# Patient Record
Sex: FEMALE | Race: WHITE | HISPANIC OR LATINO | URBAN - METROPOLITAN AREA
[De-identification: names, ages, dates, MRNs, and addresses within clinical notes are randomized per-mention and may not be internally consistent; named-entity substitution may affect disease eponyms.]

---

## 2018-08-18 ENCOUNTER — EMERGENCY (EMERGENCY)
Facility: HOSPITAL | Age: 53
LOS: 1 days | Discharge: HOME | End: 2018-08-18
Attending: EMERGENCY MEDICINE
Payer: COMMERCIAL

## 2018-08-18 VITALS
OXYGEN SATURATION: 95 % | HEART RATE: 93 BPM | HEIGHT: 61 IN | TEMPERATURE: 101 F | WEIGHT: 126.99 LBS | DIASTOLIC BLOOD PRESSURE: 90 MMHG | RESPIRATION RATE: 18 BRPM | SYSTOLIC BLOOD PRESSURE: 135 MMHG

## 2018-08-18 LAB
ALBUMIN SERPL ELPH-MCNC: 3.9 G/DL — SIGNIFICANT CHANGE UP (ref 3.3–5)
ALP SERPL-CCNC: 61 U/L — SIGNIFICANT CHANGE UP (ref 40–120)
ALT FLD-CCNC: 15 U/L — SIGNIFICANT CHANGE UP (ref 10–45)
ANION GAP SERPL CALC-SCNC: 15 MMOL/L — SIGNIFICANT CHANGE UP (ref 5–17)
APPEARANCE UR: CLEAR — SIGNIFICANT CHANGE UP
AST SERPL-CCNC: 19 U/L — SIGNIFICANT CHANGE UP (ref 10–40)
BACTERIA # UR AUTO: ABNORMAL /HPF
BASOPHILS # BLD AUTO: 0 K/UL — SIGNIFICANT CHANGE UP (ref 0–0.2)
BASOPHILS NFR BLD AUTO: 0 % — SIGNIFICANT CHANGE UP (ref 0–2)
BILIRUB SERPL-MCNC: 0.3 MG/DL — SIGNIFICANT CHANGE UP (ref 0.2–1.2)
BILIRUB UR-MCNC: NEGATIVE — SIGNIFICANT CHANGE UP
BUN SERPL-MCNC: 10 MG/DL — SIGNIFICANT CHANGE UP (ref 7–23)
CALCIUM SERPL-MCNC: 9.6 MG/DL — SIGNIFICANT CHANGE UP (ref 8.4–10.5)
CHLORIDE SERPL-SCNC: 96 MMOL/L — SIGNIFICANT CHANGE UP (ref 96–108)
CO2 SERPL-SCNC: 24 MMOL/L — SIGNIFICANT CHANGE UP (ref 22–31)
COLOR SPEC: YELLOW — SIGNIFICANT CHANGE UP
COMMENT - URINE: SIGNIFICANT CHANGE UP
CREAT SERPL-MCNC: 0.73 MG/DL — SIGNIFICANT CHANGE UP (ref 0.5–1.3)
DIFF PNL FLD: ABNORMAL
EOSINOPHIL # BLD AUTO: 0 K/UL — SIGNIFICANT CHANGE UP (ref 0–0.5)
EOSINOPHIL NFR BLD AUTO: 0.2 % — SIGNIFICANT CHANGE UP (ref 0–6)
EPI CELLS # UR: SIGNIFICANT CHANGE UP /HPF
GLUCOSE SERPL-MCNC: 110 MG/DL — HIGH (ref 70–99)
GLUCOSE UR QL: NEGATIVE — SIGNIFICANT CHANGE UP
HCT VFR BLD CALC: 40.4 % — SIGNIFICANT CHANGE UP (ref 34.5–45)
HGB BLD-MCNC: 13.4 G/DL — SIGNIFICANT CHANGE UP (ref 11.5–15.5)
KETONES UR-MCNC: ABNORMAL
LEUKOCYTE ESTERASE UR-ACNC: ABNORMAL
LYMPHOCYTES # BLD AUTO: 0.9 K/UL — LOW (ref 1–3.3)
LYMPHOCYTES # BLD AUTO: 7.9 % — LOW (ref 13–44)
MCHC RBC-ENTMCNC: 29.6 PG — SIGNIFICANT CHANGE UP (ref 27–34)
MCHC RBC-ENTMCNC: 33.1 GM/DL — SIGNIFICANT CHANGE UP (ref 32–36)
MCV RBC AUTO: 89.4 FL — SIGNIFICANT CHANGE UP (ref 80–100)
MONOCYTES # BLD AUTO: 1 K/UL — HIGH (ref 0–0.9)
MONOCYTES NFR BLD AUTO: 8.5 % — SIGNIFICANT CHANGE UP (ref 2–14)
NEUTROPHILS # BLD AUTO: 9.9 K/UL — HIGH (ref 1.8–7.4)
NEUTROPHILS NFR BLD AUTO: 83.3 % — HIGH (ref 43–77)
NITRITE UR-MCNC: NEGATIVE — SIGNIFICANT CHANGE UP
PH UR: 6 — SIGNIFICANT CHANGE UP (ref 5–8)
PLATELET # BLD AUTO: 265 K/UL — SIGNIFICANT CHANGE UP (ref 150–400)
POTASSIUM SERPL-MCNC: 3.6 MMOL/L — SIGNIFICANT CHANGE UP (ref 3.5–5.3)
POTASSIUM SERPL-SCNC: 3.6 MMOL/L — SIGNIFICANT CHANGE UP (ref 3.5–5.3)
PROT SERPL-MCNC: 7.3 G/DL — SIGNIFICANT CHANGE UP (ref 6–8.3)
PROT UR-MCNC: 30 MG/DL
RBC # BLD: 4.52 M/UL — SIGNIFICANT CHANGE UP (ref 3.8–5.2)
RBC # FLD: 12 % — SIGNIFICANT CHANGE UP (ref 10.3–14.5)
RBC CASTS # UR COMP ASSIST: ABNORMAL /HPF (ref 0–2)
SODIUM SERPL-SCNC: 135 MMOL/L — SIGNIFICANT CHANGE UP (ref 135–145)
SP GR SPEC: 1.02 — SIGNIFICANT CHANGE UP (ref 1.01–1.02)
UROBILINOGEN FLD QL: NEGATIVE — SIGNIFICANT CHANGE UP
WBC # BLD: 11.8 K/UL — HIGH (ref 3.8–10.5)
WBC # FLD AUTO: 11.8 K/UL — HIGH (ref 3.8–10.5)
WBC UR QL: SIGNIFICANT CHANGE UP /HPF (ref 0–5)

## 2018-08-18 PROCEDURE — 99284 EMERGENCY DEPT VISIT MOD MDM: CPT

## 2018-08-18 RX ORDER — KETOROLAC TROMETHAMINE 30 MG/ML
15 SYRINGE (ML) INJECTION ONCE
Refills: 0 | Status: DISCONTINUED | OUTPATIENT
Start: 2018-08-18 | End: 2018-08-18

## 2018-08-18 RX ORDER — SODIUM CHLORIDE 9 MG/ML
1000 INJECTION INTRAMUSCULAR; INTRAVENOUS; SUBCUTANEOUS ONCE
Refills: 0 | Status: COMPLETED | OUTPATIENT
Start: 2018-08-18 | End: 2018-08-18

## 2018-08-18 RX ORDER — ACETAMINOPHEN 500 MG
975 TABLET ORAL ONCE
Refills: 0 | Status: COMPLETED | OUTPATIENT
Start: 2018-08-18 | End: 2018-08-18

## 2018-08-18 RX ORDER — SODIUM CHLORIDE 9 MG/ML
1000 INJECTION, SOLUTION INTRAVENOUS ONCE
Refills: 0 | Status: COMPLETED | OUTPATIENT
Start: 2018-08-18 | End: 2018-08-18

## 2018-08-18 RX ADMIN — Medication 975 MILLIGRAM(S): at 21:09

## 2018-08-18 RX ADMIN — SODIUM CHLORIDE 1000 MILLILITER(S): 9 INJECTION INTRAMUSCULAR; INTRAVENOUS; SUBCUTANEOUS at 21:09

## 2018-08-18 RX ADMIN — Medication 15 MILLIGRAM(S): at 21:09

## 2018-08-18 RX ADMIN — SODIUM CHLORIDE 1000 MILLILITER(S): 9 INJECTION, SOLUTION INTRAVENOUS at 23:29

## 2018-08-18 NOTE — ED ADULT NURSE NOTE - NSIMPLEMENTINTERV_GEN_ALL_ED
Implemented All Universal Safety Interventions:  [place] to call system. Call bell, personal items and telephone within reach. Instruct patient to call for assistance. Room bathroom lighting operational. Non-slip footwear when patient is off stretcher. Physically safe environment: no spills, clutter or unnecessary equipment. Stretcher in lowest position, wheels locked, appropriate side rails in place.

## 2018-08-18 NOTE — ED PROVIDER NOTE - CARE PLAN
Principal Discharge DX:	Pyelonephritis  Assessment and plan of treatment:	Follow up with your medical doctor in 2-3 days or call our clinic at [phone] and state you were seen in the Emergency Department and would like to be seen in clinic.   Take Tylenol 1g every six hours and supplement with ibuprofen 600mg, with food, every six hours which can be taken three hours apart from the Tylenol to have a layered effect.  Please continue taking ciprofloxacin twice a day for 7 days.  Drink at least 2 Liters or 64 Ounces of water each day.  Return for any persistent, worsening symptoms, or ANY concerns at all.

## 2018-08-18 NOTE — ED PROVIDER NOTE - OBJECTIVE STATEMENT
52 yo female presenting with fevers malaise and right flank achy abdominal pain, 7/10 in severity with no radiation.  worse with pressure, improved with tylenol.  diagnosed with uti and given cipro today at [facility] md.  endorses foul smelling urine.  vomited x 1 on friday.  symptoms started on thursday.  went to urgent care earlier today.

## 2018-08-18 NOTE — ED PROVIDER NOTE - NS ED ROS FT
Constitutional: + fever  Eyes: no conjunctivitis  Ears: no ear pain   Nose: no nasal congestion, Mouth/Throat: no throat pain, Neck: no stiffness  Cardiovascular: no chest pain  Chest: no cough  Gastrointestinal: + abdominal pain, +vomiting and no diarrhea  MSK: no joint pain  : no dysuria +foul smelling urine  Skin: no rash  Neuro: no LOC

## 2018-08-18 NOTE — ED ADULT NURSE NOTE - OBJECTIVE STATEMENT
Pt presents to the ED with complaint of R lower quadrant pain. Pt AXOX3, reports fever and body aches with RLQ pain. Pt went to urgent care and was diagnosed with a UTI, sent home with PO Cipro. Pt states she took 2 tablets already, states she was told to come to ED if pain persisted, which it has. Pt reports "stomach feels inflamed", home tmax 101. Abdomen soft and tender to palpation to Right lower quadrant, CVA tenderness noted to R flank, no hematuria or dysuria, pt reports malodorous urine, fevers and chills at home, No shortness of breath, skins color normal for age and race, no chest pain, pt reports intermittent nausea and one episode of vomiting friday evening, no diarrhea reported.

## 2018-08-18 NOTE — ED PROVIDER NOTE - PHYSICAL EXAMINATION
gen: well appearing  Mentation: AAO x 3  psych: mood appropriate  ENT: airway patent  Eyes: conjunctivae clear bilaterally  Cardio: RRR, no m/r/g  Resp: normal BS b/l  GI: s/ruq tenderness with positive right cva tenderness/nd   Neuro: AAO x 3, sensation and motor function intact, CN 2-12 intact  Skin: No evidence of rash  MSK: normal movement of all extremities

## 2018-08-18 NOTE — ED PROVIDER NOTE - MEDICAL DECISION MAKING DETAILS
54 yo female with fevers chills foul smelling urine; urine labs ultrasound to eval for jean carlos and hydro pain control fluids --> re eval

## 2018-08-18 NOTE — ED PROVIDER NOTE - ATTENDING CONTRIBUTION TO CARE
52 yo female, recently diagnosed with UTI, p/w RUQ/R flank pain.  Concern for jean carlos vs. pyelo.  Will check labs, UA, abdominal u/s and reassess.

## 2018-08-18 NOTE — ED PROVIDER NOTE - PROGRESS NOTE DETAILS
patient feels significantly better, tolerating po, will continue with her ciprofloxacin as she already took two doses today -m[place]uer[place]

## 2018-08-18 NOTE — ED ADULT TRIAGE NOTE - CHIEF COMPLAINT QUOTE
fever, body aches, frequency of urination, went to urgent care center was diagnosed with UTI, started on Cipro. "still don't feel well"

## 2018-08-18 NOTE — ED PROVIDER NOTE - PLAN OF CARE
Follow up with your medical doctor in 2-3 days or call our clinic at [phone] and state you were seen in the Emergency Department and would like to be seen in clinic.   Take Tylenol 1g every six hours and supplement with ibuprofen 600mg, with food, every six hours which can be taken three hours apart from the Tylenol to have a layered effect.  Please continue taking ciprofloxacin twice a day for 7 days.  Drink at least 2 Liters or 64 Ounces of water each day.  Return for any persistent, worsening symptoms, or ANY concerns at all.

## 2018-08-19 VITALS
OXYGEN SATURATION: 98 % | DIASTOLIC BLOOD PRESSURE: 79 MMHG | TEMPERATURE: 98 F | SYSTOLIC BLOOD PRESSURE: 123 MMHG | RESPIRATION RATE: 18 BRPM | HEART RATE: 86 BPM

## 2018-08-19 LAB
CULTURE RESULTS: NO GROWTH — SIGNIFICANT CHANGE UP
SPECIMEN SOURCE: SIGNIFICANT CHANGE UP

## 2018-08-19 PROCEDURE — 76700 US EXAM ABDOM COMPLETE: CPT | Mod: 26

## 2018-08-19 PROCEDURE — 74177 CT ABD & PELVIS W/CONTRAST: CPT | Mod: 26

## 2019-07-10 PROBLEM — Z00.00 ENCOUNTER FOR PREVENTIVE HEALTH EXAMINATION: Status: ACTIVE | Noted: 2019-07-10

## 2019-07-11 ENCOUNTER — APPOINTMENT (OUTPATIENT)
Dept: OPHTHALMOLOGY | Facility: CLINIC | Age: 54
End: 2019-07-11

## 2020-03-25 ENCOUNTER — APPOINTMENT (OUTPATIENT)
Dept: OPHTHALMOLOGY | Facility: CLINIC | Age: 55
End: 2020-03-25

## 2020-10-21 ENCOUNTER — NON-APPOINTMENT (OUTPATIENT)
Age: 55
End: 2020-10-21

## 2020-10-21 ENCOUNTER — APPOINTMENT (OUTPATIENT)
Dept: OPHTHALMOLOGY | Facility: CLINIC | Age: 55
End: 2020-10-21
Payer: COMMERCIAL

## 2020-10-21 PROCEDURE — 92004 COMPRE OPH EXAM NEW PT 1/>: CPT

## 2020-10-21 PROCEDURE — 99072 ADDL SUPL MATRL&STAF TM PHE: CPT

## 2020-11-12 ENCOUNTER — APPOINTMENT (OUTPATIENT)
Dept: DERMATOLOGY | Facility: CLINIC | Age: 55
End: 2020-11-12
Payer: COMMERCIAL

## 2020-11-12 VITALS — BODY MASS INDEX: 24.17 KG/M2 | WEIGHT: 128 LBS | HEIGHT: 61 IN

## 2020-11-12 DIAGNOSIS — L81.1 CHLOASMA: ICD-10-CM

## 2020-11-12 DIAGNOSIS — L65.8 OTHER SPECIFIED NONSCARRING HAIR LOSS: ICD-10-CM

## 2020-11-12 DIAGNOSIS — L24.9 IRRITANT CONTACT DERMATITIS, UNSPECIFIED CAUSE: ICD-10-CM

## 2020-11-12 PROCEDURE — 99203 OFFICE O/P NEW LOW 30 MIN: CPT

## 2020-11-12 PROCEDURE — 99072 ADDL SUPL MATRL&STAF TM PHE: CPT

## 2020-11-12 RX ORDER — TRIAMCINOLONE ACETONIDE 1 MG/G
0.1 CREAM TOPICAL TWICE DAILY
Qty: 1 | Refills: 1 | Status: ACTIVE | COMMUNITY
Start: 2020-11-12 | End: 1900-01-01

## 2020-11-16 ENCOUNTER — NON-APPOINTMENT (OUTPATIENT)
Age: 55
End: 2020-11-16

## 2020-11-20 ENCOUNTER — APPOINTMENT (OUTPATIENT)
Dept: ORTHOPEDIC SURGERY | Facility: CLINIC | Age: 55
End: 2020-11-20
Payer: COMMERCIAL

## 2020-11-20 VITALS — HEIGHT: 61 IN | BODY MASS INDEX: 24.55 KG/M2 | WEIGHT: 130 LBS

## 2020-11-20 DIAGNOSIS — M70.62 TROCHANTERIC BURSITIS, LEFT HIP: ICD-10-CM

## 2020-11-20 DIAGNOSIS — M51.36 OTHER INTERVERTEBRAL DISC DEGENERATION, LUMBAR REGION: ICD-10-CM

## 2020-11-20 PROCEDURE — 73502 X-RAY EXAM HIP UNI 2-3 VIEWS: CPT | Mod: LT

## 2020-11-20 PROCEDURE — 99203 OFFICE O/P NEW LOW 30 MIN: CPT

## 2020-11-20 PROCEDURE — 72100 X-RAY EXAM L-S SPINE 2/3 VWS: CPT

## 2020-12-06 ENCOUNTER — OUTPATIENT (OUTPATIENT)
Dept: OUTPATIENT SERVICES | Facility: HOSPITAL | Age: 55
LOS: 1 days | End: 2020-12-06
Payer: COMMERCIAL

## 2020-12-06 ENCOUNTER — APPOINTMENT (OUTPATIENT)
Dept: MRI IMAGING | Facility: CLINIC | Age: 55
End: 2020-12-06
Payer: COMMERCIAL

## 2020-12-06 ENCOUNTER — RESULT REVIEW (OUTPATIENT)
Age: 55
End: 2020-12-06

## 2020-12-06 DIAGNOSIS — M51.36 OTHER INTERVERTEBRAL DISC DEGENERATION, LUMBAR REGION: ICD-10-CM

## 2020-12-06 PROCEDURE — 72148 MRI LUMBAR SPINE W/O DYE: CPT | Mod: 26

## 2020-12-07 ENCOUNTER — APPOINTMENT (OUTPATIENT)
Dept: OPHTHALMOLOGY | Facility: CLINIC | Age: 55
End: 2020-12-07

## 2020-12-07 ENCOUNTER — NON-APPOINTMENT (OUTPATIENT)
Age: 55
End: 2020-12-07

## 2020-12-14 ENCOUNTER — APPOINTMENT (OUTPATIENT)
Dept: ORTHOPEDIC SURGERY | Facility: CLINIC | Age: 55
End: 2020-12-14
Payer: COMMERCIAL

## 2020-12-14 ENCOUNTER — TRANSCRIPTION ENCOUNTER (OUTPATIENT)
Age: 55
End: 2020-12-14

## 2020-12-14 VITALS
HEIGHT: 61 IN | SYSTOLIC BLOOD PRESSURE: 134 MMHG | WEIGHT: 129 LBS | DIASTOLIC BLOOD PRESSURE: 75 MMHG | HEART RATE: 75 BPM | BODY MASS INDEX: 24.35 KG/M2

## 2020-12-14 VITALS — TEMPERATURE: 96.5 F

## 2020-12-14 PROCEDURE — 99214 OFFICE O/P EST MOD 30 MIN: CPT

## 2020-12-14 PROCEDURE — 99072 ADDL SUPL MATRL&STAF TM PHE: CPT

## 2021-03-17 ENCOUNTER — APPOINTMENT (OUTPATIENT)
Dept: ORTHOPEDIC SURGERY | Facility: CLINIC | Age: 56
End: 2021-03-17

## 2022-01-20 ENCOUNTER — NON-APPOINTMENT (OUTPATIENT)
Age: 57
End: 2022-01-20

## 2022-01-20 ENCOUNTER — APPOINTMENT (OUTPATIENT)
Dept: OPHTHALMOLOGY | Facility: CLINIC | Age: 57
End: 2022-01-20
Payer: COMMERCIAL

## 2022-01-20 PROCEDURE — 92014 COMPRE OPH EXAM EST PT 1/>: CPT

## 2022-01-20 PROCEDURE — 92134 CPTRZ OPH DX IMG PST SGM RTA: CPT

## 2022-02-21 ENCOUNTER — RESULT REVIEW (OUTPATIENT)
Age: 57
End: 2022-02-21

## 2023-05-16 ENCOUNTER — APPOINTMENT (OUTPATIENT)
Dept: ORTHOPEDIC SURGERY | Facility: CLINIC | Age: 58
End: 2023-05-16
Payer: COMMERCIAL

## 2023-05-16 ENCOUNTER — NON-APPOINTMENT (OUTPATIENT)
Age: 58
End: 2023-05-16

## 2023-05-16 VITALS
DIASTOLIC BLOOD PRESSURE: 83 MMHG | HEART RATE: 58 BPM | HEIGHT: 61 IN | BODY MASS INDEX: 24.55 KG/M2 | WEIGHT: 130 LBS | SYSTOLIC BLOOD PRESSURE: 148 MMHG

## 2023-05-16 DIAGNOSIS — M79.673 PAIN IN UNSPECIFIED FOOT: ICD-10-CM

## 2023-05-16 DIAGNOSIS — M10.9 GOUT, UNSPECIFIED: ICD-10-CM

## 2023-05-16 PROCEDURE — 99203 OFFICE O/P NEW LOW 30 MIN: CPT

## 2023-05-16 PROCEDURE — 73630 X-RAY EXAM OF FOOT: CPT | Mod: LT

## 2023-05-16 RX ORDER — NAPROXEN 500 MG/1
500 TABLET ORAL
Qty: 20 | Refills: 1 | Status: ACTIVE | COMMUNITY
Start: 2023-05-16 | End: 1900-01-01

## 2023-05-17 LAB — URATE SERPL-MCNC: 4.5 MG/DL

## 2024-01-29 ENCOUNTER — APPOINTMENT (OUTPATIENT)
Dept: ORTHOPEDIC SURGERY | Facility: CLINIC | Age: 59
End: 2024-01-29
Payer: COMMERCIAL

## 2024-01-29 VITALS
DIASTOLIC BLOOD PRESSURE: 80 MMHG | HEIGHT: 61 IN | SYSTOLIC BLOOD PRESSURE: 135 MMHG | BODY MASS INDEX: 25.11 KG/M2 | HEART RATE: 74 BPM | WEIGHT: 133 LBS

## 2024-01-29 DIAGNOSIS — M54.2 CERVICALGIA: ICD-10-CM

## 2024-01-29 DIAGNOSIS — M51.36 OTHER INTERVERTEBRAL DISC DEGENERATION, LUMBAR REGION: ICD-10-CM

## 2024-01-29 PROCEDURE — 72100 X-RAY EXAM L-S SPINE 2/3 VWS: CPT

## 2024-01-29 PROCEDURE — 72040 X-RAY EXAM NECK SPINE 2-3 VW: CPT

## 2024-01-29 PROCEDURE — 99204 OFFICE O/P NEW MOD 45 MIN: CPT

## 2024-01-29 RX ORDER — DICLOFENAC SODIUM 75 MG/1
75 TABLET, DELAYED RELEASE ORAL
Qty: 60 | Refills: 1 | Status: ACTIVE | COMMUNITY
Start: 2024-01-29 | End: 1900-01-01

## 2024-01-29 NOTE — DISCUSSION/SUMMARY
[de-identified] : Patient with Lumbar degenerative disc disease and cervical degenerative disc disease. Discussed all options. Patient is to begin PT. A script was provided today.  Discussed exercise do's and don'ts. Diclofenac prn. MRI of the lumbar spine was ordered. Follow up when results are available and discuss further treatment plan. All options discussed including rest, medicine, home exercise, acupuncture, Chiropractic care, Physical Therapy, Pain management, and last resort surgery. All questions were answered, all alternatives discussed, and the patient is in complete agreement with the treatment plan which the patient contributed to and discussed with me through the shared decision-making process. Follow-up appointment as instructed. Any issues and the patient will call or come in sooner.

## 2024-01-29 NOTE — HISTORY OF PRESENT ILLNESS
[Stable] : stable [de-identified] : Ms. DALE POLANCO is a 58 year female who presents to office complaining of neck and lower back pain. Regarding her neck, patient has seen an outside neurologist for headaches who ordered an MRI of the cervical spine about 1 year ago, which showed herniated discs. He suggested she do PT for this, which she did, which did not help. Aside from this, she has not had any other treatment such as ESIs. Her current symptoms of the neck are a constant dull, achy pain localized to the midline of the neck. Pain is aggravated with working out or with overhead lifting. This occasionally results in numbness/tingling of her LUE. Patient was previously seen for lower back pain in Dec 2020. SHx of lumbar laminectomy in January 1992. Pain radiates down LLE to the L hip and down to posterior thigh. Has occasional tingling of LLE. Running aggravates the pain. Has been taking Motrin and has relief. Also has been doing yoga and stretching which alleviates pain. She has not been exercising due to the pain Has not done PT or chiropractic care recently or ESIs. Has MRI Lumbar 12/6/20 (PACS). No fever chills sweats nausea vomiting no bowel or bladder dysfunction, no recent weight loss or gain no night pain. This history is in addition to the intake form that I personally reviewed.

## 2024-01-29 NOTE — PHYSICAL EXAM
[Normal] : Gait: normal [Moreno's Sign] : negative Moreno's sign [Pronator Drift] : negative pronator drift [SLR] : negative straight leg raise [de-identified] : 5 out of 5 motor strength, sensation is intact and symmetrical full range of motion flexion extension and rotation,  no palpatory tenderness full range of motion of hips knees shoulders and elbows (all four extremities),  no atrophy, negative straight leg raise, no pathological reflexes, no swelling, normal ambulation,  no apparent distress skin is intact, no palpable lymph nodes, no upper or lower extremity instability,  alert and oriented x3 and normal mood. Normal finger-to nose test. Limited right rotation  [de-identified] : XR AP Lat Cervical 01/29/2024 - cervical degenerative disc disease at the C5-C6, C6 - C7 level - reviewed with patient.  XR AP Lat Lumbar 01/29/2024 - Lumbar degenerative disc disease at the L3 - L4, L4 - 5, and L5 - S1 level, spondylolisthesis at L3 - L4, L4 - L5 level - reviewed with patient.

## 2024-01-29 NOTE — ADDENDUM
[FreeTextEntry1] : I, Tyar Saleh, acted solely as a scribe for Dr. Burak Xaio MD on this date 01/29/2024.   All medical record entries made by the Scribe were at my, Dr. Burak Xiao MD, direction and personally dictated by me on 01/29/2024. I have reviewed the chart and agree that the record accurately reflects my personal performance of the history, physical exam, assessment and plan. I have also personally directed, reviewed, and agreed with the chart.

## 2024-02-12 ENCOUNTER — NON-APPOINTMENT (OUTPATIENT)
Age: 59
End: 2024-02-12

## 2024-02-18 ENCOUNTER — APPOINTMENT (OUTPATIENT)
Dept: MRI IMAGING | Facility: CLINIC | Age: 59
End: 2024-02-18
Payer: COMMERCIAL

## 2024-02-18 ENCOUNTER — OUTPATIENT (OUTPATIENT)
Dept: OUTPATIENT SERVICES | Facility: HOSPITAL | Age: 59
LOS: 1 days | End: 2024-02-18
Payer: COMMERCIAL

## 2024-02-18 DIAGNOSIS — M48.07 SPINAL STENOSIS, LUMBOSACRAL REGION: ICD-10-CM

## 2024-02-18 PROCEDURE — 72148 MRI LUMBAR SPINE W/O DYE: CPT

## 2024-02-18 PROCEDURE — 72148 MRI LUMBAR SPINE W/O DYE: CPT | Mod: 26

## 2024-03-04 ENCOUNTER — APPOINTMENT (OUTPATIENT)
Dept: ORTHOPEDIC SURGERY | Facility: CLINIC | Age: 59
End: 2024-03-04
Payer: COMMERCIAL

## 2024-03-04 VITALS — HEIGHT: 61 IN | BODY MASS INDEX: 25.3 KG/M2 | WEIGHT: 134 LBS

## 2024-03-04 PROCEDURE — 99214 OFFICE O/P EST MOD 30 MIN: CPT

## 2024-03-04 NOTE — DISCUSSION/SUMMARY
[de-identified] : Lumbar degenerative disc disease and cervical degenerative disc disease. L4-5 spondylolisthesis and stenosis. +right rotator cuff pathology. Discussed all options. Right shoulder MRI referral. F/U after MRI. Surgery would be L4-S1 laminectomy and fusion. We will avoid surgery for now. All options discussed including rest, medicine, home exercise, acupuncture, Chiropractic care, Physical Therapy, Pain management, and last resort surgery. All questions were answered, all alternatives discussed, and the patient is in complete agreement with the treatment plan which the patient contributed to and discussed with me through the shared decision-making process. Follow-up appointment as instructed. Any issues and the patient will call or come in sooner.

## 2024-03-04 NOTE — HISTORY OF PRESENT ILLNESS
[Stable] : stable [de-identified] : Ms. DALE POLANCO is a 58 year female who presents to office complaining of neck and lower back pain. SHx of lumbar laminectomy in January 1992. Regarding her neck, patient has seen an outside neurologist for headaches who ordered an MRI of the cervical spine about 1 year ago, which showed herniated discs. He suggested she do PT for this, which she did, which did not help. Aside from this, she has not had any other treatment such as ESIs. Her current symptoms of the neck are a constant dull, achy pain localized to the midline of the neck. Pain is aggravated with working out or with overhead lifting. This occasionally results in numbness/tingling of her LUE. Pain radiates down LLE to the L hip and down to posterior thigh. Has occasional tingling of LLE. Running aggravates the pain. Has been taking Motrin and has relief. Also has been doing yoga and stretching which alleviates pain. She has not been exercising due to the pain Denies EDUARDO.  Was referred to PT at last visit. Has not started PT yet.  She also complains of B/L shoulder pain, R>L. Sees an outside orthopedist for this.  Presents today for MRI Lumbar review. No fever chills sweats nausea vomiting no bowel or bladder dysfunction, no recent weight loss or gain no night pain. This history is in addition to the intake form that I personally reviewed.

## 2024-03-04 NOTE — PHYSICAL EXAM
[Normal] : Gait: normal [Moreno's Sign] : negative Moreno's sign [Pronator Drift] : negative pronator drift [SLR] : negative straight leg raise [de-identified] : 5 out of 5 motor strength, sensation is intact and symmetrical full range of motion flexion extension and rotation, no palpatory tenderness full range of motion of hips knees shoulders and elbows (all four extremities), no atrophy, negative straight leg raise, no pathological reflexes, no swelling, normal ambulation, no apparent distress skin is intact, no palpable lymph nodes, no upper or lower extremity instability, alert and oriented x3 and normal mood. Normal finger-to nose test.  No upper motor neuron findings. +right rotator cuff pathology. [de-identified] : I reviewed, interpreted and clinically correlated the following outside imaging studies,  MR SPINE LUMBAR  - ORDERED BY: RUT GAMEZ   PROCEDURE DATE:  02/18/2024    INTERPRETATION:  CLINICAL INFORMATION: Lumbosacral stenosis  ADDITIONAL CLINICAL INFORMATION: Spondylolisthesis M43.16  TECHNIQUE: Multiplanar, multisequence MRI was performed of the lumbar spine. IV Contrast: NONE  PRIOR STUDIES: 12/6/2020  FINDINGS:  LOCALIZER: No additional findings. BONES: There is no fracture or bone marrow edema. ALIGNMENT: Grade 1 anterolisthesis at L4-L5. SACROILIAC JOINTS/SACRUM: There is no sacral fracture. The SI joints are partially visualized but are intact. CONUS AND CAUDA EQUINA: The distal cord and conus are normal in signal. Conus terminates at L1. VISUALIZED INTRAPELVIC/INTRA-ABDOMINAL SOFT TISSUES: Normal. PARASPINAL SOFT TISSUES: Normal.   INDIVIDUAL LEVELS:  LOWER THORACIC SPINE: No spinal canal or neuroforaminal stenosis.  L1-L2: Mild facet arthropathy. No spinal canal or neuroforaminal stenosis. L2-L3: Small posterior disc bulge and mild bilateral facet arthropathy without significant central canal or neural foraminal narrowing. L3-L4: Small broad-based posterior disc bulge and moderate bilateral facet arthropathy result in mild bilateral neural foraminal narrowing and mild to moderate central canal narrowing. There is bilateral lateral recess stenosis. L4-L5: Grade 1 anterolisthesis. Broad-based posterior disc bulge and moderate bilateral facet arthropathy result in mild to moderate bilateral neural foraminal narrowing and mild central canal narrowing. There is right lateral recess stenosis. L5-S1: Small posterior disc osteophyte complex and moderate bilateral facet arthropathy result in mild bilateral neural foraminal narrowing but no significant central canal narrowing.   IMPRESSION:  Multilevel discogenic degenerative disease and facet arthropathy of the lumbar spine, most pronounced at L4-L5 where there is mild to moderate bilateral neural foraminal narrowing and mild central canal narrowing. Additional varying degrees of central canal and neural foraminal narrowing, as above. These findings are not significantly changed as compared to 12/6/2020.  --- End of Report ---       XR AP Lat Cervical 01/29/2024 - cervical degenerative disc disease at the C5-C6, C6 - C7 level - reviewed with patient.  XR AP Lat Lumbar 01/29/2024 - Lumbar degenerative disc disease at the L3 - L4, L4 - 5, and L5 - S1 level, spondylolisthesis at L3 - L4, L4 - L5 level - reviewed with patient.

## 2024-03-04 NOTE — ADDENDUM
[FreeTextEntry1] : This note was written by Amos James on 03/04/2024 acting as scribe for Dr. Case Xiao M.D.  I, Case Xiao MD, have read and attest that all the information, medical decision making and discharge instructions within are true and accurate.

## 2024-03-08 ENCOUNTER — APPOINTMENT (OUTPATIENT)
Dept: MRI IMAGING | Facility: CLINIC | Age: 59
End: 2024-03-08
Payer: COMMERCIAL

## 2024-03-08 ENCOUNTER — OUTPATIENT (OUTPATIENT)
Dept: OUTPATIENT SERVICES | Facility: HOSPITAL | Age: 59
LOS: 1 days | End: 2024-03-08
Payer: COMMERCIAL

## 2024-03-08 DIAGNOSIS — Z00.8 ENCOUNTER FOR OTHER GENERAL EXAMINATION: ICD-10-CM

## 2024-03-08 DIAGNOSIS — M51.36 OTHER INTERVERTEBRAL DISC DEGENERATION, LUMBAR REGION: ICD-10-CM

## 2024-03-08 PROCEDURE — 73221 MRI JOINT UPR EXTREM W/O DYE: CPT | Mod: 26,RT

## 2024-03-08 PROCEDURE — 73221 MRI JOINT UPR EXTREM W/O DYE: CPT

## 2024-03-14 ENCOUNTER — APPOINTMENT (OUTPATIENT)
Dept: ORTHOPEDIC SURGERY | Facility: CLINIC | Age: 59
End: 2024-03-14
Payer: COMMERCIAL

## 2024-03-14 VITALS — HEIGHT: 61 IN | BODY MASS INDEX: 25.3 KG/M2 | WEIGHT: 134 LBS

## 2024-03-14 DIAGNOSIS — M48.07 SPINAL STENOSIS, LUMBOSACRAL REGION: ICD-10-CM

## 2024-03-14 DIAGNOSIS — M43.16 SPONDYLOLISTHESIS, LUMBAR REGION: ICD-10-CM

## 2024-03-14 PROCEDURE — 99214 OFFICE O/P EST MOD 30 MIN: CPT

## 2024-03-14 NOTE — ADDENDUM
[FreeTextEntry1] : This note was written by Amos James on 03/14/2024 acting as scribe for Dr. Case Xiao M.D.  I, Case Xiao MD, have read and attest that all the information, medical decision making and discharge instructions within are true and accurate.

## 2024-03-14 NOTE — PHYSICAL EXAM
[Normal] : Gait: normal [Pronator Drift] : negative pronator drift [Moreno's Sign] : negative Moreno's sign [SLR] : negative straight leg raise [de-identified] : 5 out of 5 motor strength, sensation is intact and symmetrical full range of motion flexion extension and rotation, no palpatory tenderness full range of motion of hips knees shoulders and elbows (all four extremities), no atrophy, negative straight leg raise, no pathological reflexes, no swelling, normal ambulation, no apparent distress skin is intact, no palpable lymph nodes, no upper or lower extremity instability, alert and oriented x3 and normal mood. Normal finger-to nose test.  No upper motor neuron findings. +right rotator cuff pathology. [de-identified] : I reviewed, interpreted and clinically correlated the following outside imaging studies,  MR SHOULDER RT  - ORDERED BY: RUT GAMEZ   PROCEDURE DATE:  03/08/2024    INTERPRETATION:  CLINICAL INDICATION: Shoulder pain for one month.  Multiplanar Multisequence MR of the right shoulder.  Prior Studies: None.  FINDINGS:  ROTATOR CUFF: Moderate tendinosis of the supraspinatus tendon with foci of intrasubstance tearing along the mid to posterior insertional fibers. Mild to moderate tendinosis of the infraspinatus tendon with enthesopathic change along the posterior aspect of the greater tuberosity. The subscapularis and teres minor tendinous insertions are intact.  MUSCLES: There is no fatty atrophy or edema within the rotator cuff musculature or deltoid muscle.  LONG HEAD BICEPS TENDON: Mild to moderate tendinosis of the intra-articular long head biceps tendon. There is mild fraying at the biceps anchor. Circumferential fluid is seen within the extra articular biceps tendon sheath suggestive of tenosynovitis.  GLENOHUMERAL JOINT: Degeneration of the glenoid labrum. Partial-thickness nondisplaced tearing along the chondral labral junction of the superior to posterior superior glenoid labrum. The articular surfaces are preserved.  SYNOVIUM: Subacromial/subdeltoid bursitis. Mild glenohumeral joint effusion.  ACROMIOCLAVICULAR JOINT: Mild to moderate arthrosis  MARROW: There is no acute fracture or osteonecrosis.  NERVES: Intact.  SUBCUTANEOUS TISSUES: Unremarkable.  IMPRESSION:  Subacromial/subdeltoid bursitis.  Circumferential fluid in the extra articular biceps tendon sheath suggestive of tenosynovitis. Mild fraying at the biceps anchor.  Moderate tendinosis of the supraspinatus tendon with foci of intrasubstance tearing along the mid to posterior insertional fibers.  Mild to moderate acromioclavicular joint arthrosis.  --- End of Report ---    MR SPINE LUMBAR  - ORDERED BY: RUT GAMEZ   PROCEDURE DATE:  02/18/2024    INTERPRETATION:  CLINICAL INFORMATION: Lumbosacral stenosis  ADDITIONAL CLINICAL INFORMATION: Spondylolisthesis M43.16  TECHNIQUE: Multiplanar, multisequence MRI was performed of the lumbar spine. IV Contrast: NONE  PRIOR STUDIES: 12/6/2020  FINDINGS:  LOCALIZER: No additional findings. BONES: There is no fracture or bone marrow edema. ALIGNMENT: Grade 1 anterolisthesis at L4-L5. SACROILIAC JOINTS/SACRUM: There is no sacral fracture. The SI joints are partially visualized but are intact. CONUS AND CAUDA EQUINA: The distal cord and conus are normal in signal. Conus terminates at L1. VISUALIZED INTRAPELVIC/INTRA-ABDOMINAL SOFT TISSUES: Normal. PARASPINAL SOFT TISSUES: Normal.   INDIVIDUAL LEVELS:  LOWER THORACIC SPINE: No spinal canal or neuroforaminal stenosis.  L1-L2: Mild facet arthropathy. No spinal canal or neuroforaminal stenosis. L2-L3: Small posterior disc bulge and mild bilateral facet arthropathy without significant central canal or neural foraminal narrowing. L3-L4: Small broad-based posterior disc bulge and moderate bilateral facet arthropathy result in mild bilateral neural foraminal narrowing and mild to moderate central canal narrowing. There is bilateral lateral recess stenosis. L4-L5: Grade 1 anterolisthesis. Broad-based posterior disc bulge and moderate bilateral facet arthropathy result in mild to moderate bilateral neural foraminal narrowing and mild central canal narrowing. There is right lateral recess stenosis. L5-S1: Small posterior disc osteophyte complex and moderate bilateral facet arthropathy result in mild bilateral neural foraminal narrowing but no significant central canal narrowing.   IMPRESSION:  Multilevel discogenic degenerative disease and facet arthropathy of the lumbar spine, most pronounced at L4-L5 where there is mild to moderate bilateral neural foraminal narrowing and mild central canal narrowing. Additional varying degrees of central canal and neural foraminal narrowing, as above. These findings are not significantly changed as compared to 12/6/2020.  --- End of Report ---       XR AP Lat Cervical 01/29/2024 - cervical degenerative disc disease at the C5-C6, C6 - C7 level - reviewed with patient.  XR AP Lat Lumbar 01/29/2024 - Lumbar degenerative disc disease at the L3 - L4, L4 - 5, and L5 - S1 level, spondylolisthesis at L3 - L4, L4 - L5 level - reviewed with patient.

## 2024-03-14 NOTE — HISTORY OF PRESENT ILLNESS
[Stable] : stable [de-identified] : Ms. DALE POLANCO is a 58 year female who presents to office complaining of neck and lower back pain. SHx of lumbar laminectomy in January 1992. Regarding her neck, patient has seen an outside neurologist for headaches who ordered an MRI of the cervical spine about 1 year ago, which showed herniated discs. He suggested she do PT for this, which she did, which did not help. Aside from this, she has not had any other treatment such as ESIs. Her current symptoms of the neck are a constant dull, achy pain localized to the midline of the neck. Pain is aggravated with working out or with overhead lifting. This occasionally results in numbness/tingling of her LUE. Pain radiates down LLE to the L hip and down to posterior thigh. Has occasional tingling of LLE. Running aggravates the pain. Has been taking Motrin and has relief. Also has been doing yoga and stretching which alleviates pain. She has not been exercising due to the pain Denies EDUARDO.  Was referred to PT at last visit. Has not started PT yet.  She also complains of B/L shoulder pain, R>L. Sees an outside orthopedist for this.  Presents today for MRI Right Shoulder review.  No fever chills sweats nausea vomiting no bowel or bladder dysfunction, no recent weight loss or gain no night pain. This history is in addition to the intake form that I personally reviewed.

## 2024-03-14 NOTE — DISCUSSION/SUMMARY
[de-identified] : Lumbar degenerative disc disease and cervical degenerative disc disease. L4-5 spondylolisthesis and stenosis. Subacromial/subdeltoid bursitis. Moderate tendinosis of the supraspinatus tendon. Discussed all options. Referral for physical therapy. If no better, right shoulder injection. All options discussed including rest, medicine, home exercise, acupuncture, Chiropractic care, Physical Therapy, Pain management, and last resort surgery. All questions were answered, all alternatives discussed, and the patient is in complete agreement with the treatment plan which the patient contributed to and discussed with me through the shared decision-making process. Follow-up appointment as instructed. Any issues and the patient will call or come in sooner.

## 2024-06-05 ENCOUNTER — NON-APPOINTMENT (OUTPATIENT)
Age: 59
End: 2024-06-05